# Patient Record
(demographics unavailable — no encounter records)

---

## 2025-01-24 NOTE — ASSESSMENT
[FreeTextEntry1] : -F/u labs drawn in office today -Further recs pending lab results  Flu vaccine administered in office today and tolerated well

## 2025-01-24 NOTE — HEALTH RISK ASSESSMENT
[Excellent] : ~his/her~  mood as  excellent [Yes] : Yes [Monthly or less (1 pt)] : Monthly or less (1 point) [1 or 2 (0 pts)] : 1 or 2 (0 points) [Never (0 pts)] : Never (0 points) [No] : In the past 12 months have you used drugs other than those required for medical reasons? No [0] : 2) Feeling down, depressed, or hopeless: Not at all (0) [PHQ-2 Negative - No further assessment needed] : PHQ-2 Negative - No further assessment needed [HIV test declined] : HIV test declined [Hepatitis C test declined] : Hepatitis C test declined [Reviewed no changes] : Reviewed, no changes [Former] : Former [] :  [# Of Children ___] : has [unfilled] children [0-4] : 0-4 [< 15 Years] : < 15 Years [Audit-CScore] : 1 [[id number]] : 0 [Change in mental status noted] : No change in mental status noted [AdvancecareDate] : 01/25

## 2025-01-24 NOTE — HISTORY OF PRESENT ILLNESS
[FreeTextEntry1] : annual well check and to establish care w/ a new PMD [de-identified] : -PMH: HTN, Fatty Liver, +FH Early onset Heart Dz -SH: . 2 children. Formerly in the [employer]. Former smoker. Occasional EtOH use.  JH is a 41 year M whom is here today for an annual well check and to establish care w/ a new PMD Today, pt reports feeling well and is w/o complaints  Specialists Involved: -Cardio: Dr. Tillman  -Vaccines: Needs Flu, TDap -Colonoscopy:  -FH of Prostate, Colon, breast or ovarian CA: None known  -HTN: Remains on Metoprolol 50mg BID & Valsartan 160mg QD

## 2025-02-21 NOTE — ASSESSMENT
[FreeTextEntry1] : Uncontrolled generalized anxiety with panic attacks Zoloft 50 mg x 2 weeks then 75 mg daily As needed clonazepam nightly As needed Xanax during the day for panic attacks Advised to avoid operating heavy machinery or driving while on these types of medications/benzodiazepines We will follow-up in 1 month via telehealth but he knows to reach out to me sooner should he have any questions or concerns Greater than 30 minutes spent during this visit

## 2025-02-21 NOTE — HISTORY OF PRESENT ILLNESS
[FreeTextEntry1] : Telehealth audio and visual follow-up anxiety Consent obtained Doctor in office and patient at home [de-identified] : -PMH: HTN, Fatty Liver, +FH Early onset Heart Dz -SH: . 2 children. Formerly in the [employer]. Former smoker. Occasional EtOH use.  JH is a 41 year M whom is here today for Telehealth audio and visual follow-up visit regarding anxiety management Of note patient establish care with me on January 24, 2025 and at that time was started on Zoloft 25 mg daily Today, patient reports That he continues to have a lot of anxiety even after starting medication and is having panic attacks about 2-3 to's per day with associated sleep difficulties.  Denies feeling down depressed.  Denies SI/HI.

## 2025-03-25 NOTE — HISTORY OF PRESENT ILLNESS
[FreeTextEntry1] : Telehealth audio and visual follow-up anxiety Consent obtained Doctor in office and patient at home [de-identified] : -PMH: HTN, Fatty Liver, +FH Early onset Heart Dz -SH: . 2 children. Formerly in the [employer]. Former smoker. Occasional EtOH use.  JH is a 41 year M whom is here today for Telehealth audio and visual follow-up visit regarding anxiety management Now on Zoloft 50 mg daily Today, patient reports That he continues to have anxiety. he does to continue to have panic attacks but they are improved. Previously having panic attacks 2x/day and now 1x/day. Reports that his previously reported sleep difficulties have slightly improved.  Denies feeling down depressed.  Denies SI/HI. He states that the Clonazepam was to much for him but the xanax  has been working well for him  he states that he has been taking xanax PRN daily much benefit

## 2025-04-25 NOTE — ASSESSMENT
[FreeTextEntry1] : Significant improvement in patient's generalized anxiety with almost near resolution of panic attacks Increase Zoloft up to 100 mg Continue with as needed Xanax for panic attacks Follow-up in office 6 weeks sooner if needed

## 2025-04-25 NOTE — HISTORY OF PRESENT ILLNESS
[FreeTextEntry1] : Telehealth audio and visual follow-up anxiety Consent obtained Doctor in office and patient at home [de-identified] : -PMH: HTN, Fatty Liver, +FH Early onset Heart Dz -SH: . 2 children. Formerly in the [employer]. Former smoker. Occasional EtOH use.  JH is a 41 year M whom is here today for follow-up anxiety management Now on Zoloft 75 mg daily Today, patient reports That he continues to have anxiety. he does to continue to have panic attacks but they are improved. Previously having panic attacks 2x/day and now only 2-3x/week and mainly in social settings. Reports that his previously reported sleep difficulties have improved.  Denies feeling down depressed.  Denies SI/HI. he states that he has been taking xanax PRN daily much benefit

## 2025-06-09 NOTE — HISTORY OF PRESENT ILLNESS
[de-identified] : -PMH: HTN, Fatty Liver, +FH Early onset Heart Dz -SH: . 2 children. Formerly in the [employer]. Former smoker. Occasional EtOH use.  JH is a 41 year M whom is here today for follow-up anxiety, htn, obesity, hyperTG, elevated ALT Now on Zoloft 100 mg daily  htn; remains on valsartan 160mg qd & metoprolol tartrate 50mg bid per cardio  [FreeTextEntry1] : anxiety, htn, obesity, hyperTG, elevated ALT

## 2025-06-09 NOTE — HISTORY OF PRESENT ILLNESS
[FreeTextEntry1] : anxiety, htn, obesity, hyperTG, elevated ALT [de-identified] : -PMH: HTN, Fatty Liver, +FH Early onset Heart Dz -SH: . 2 children. Formerly in the [employer]. Former smoker. Occasional EtOH use.  JH is a 41 year M whom is here today for follow-up anxiety, htn, obesity, hyperTG, elevated ALT Now on Zoloft 100 mg daily  htn; remains on valsartan 160mg qd & metoprolol tartrate 50mg bid per cardio

## 2025-06-09 NOTE — ADDENDUM
[FreeTextEntry1] : Transaminitis detected on most recent blood work I want to further evaluate these lab findings by having patient obtain abdominal ultrasound Further recommendations to come pending results  Elevated triglycerides, total and LDL cholesterol Dietary/lifestyle modifications with goal of weight loss is strongly advised Mediterranean type diet preferred Following implemented changes I like to see patient back in 3 months for repeat fasting blood work